# Patient Record
Sex: FEMALE | Race: WHITE | Employment: UNEMPLOYED | URBAN - METROPOLITAN AREA
[De-identification: names, ages, dates, MRNs, and addresses within clinical notes are randomized per-mention and may not be internally consistent; named-entity substitution may affect disease eponyms.]

---

## 2018-12-10 LAB
AMB EXT ANTIBODY SCREEN: NEGATIVE
AMB EXT HBSAG SCREEN: NON REACTIVE
AMB EXT HEMATOCRIT: 34.9
AMB EXT HEMOGLOBIN: 12
AMB EXT MCV: 88.6
AMB EXT PLATELETS: 179
AMB EXT RH FACTOR: POSITIVE
AMB EXT RUBELLA ANTIBODIES, IGG: POSITIVE

## 2019-03-11 ENCOUNTER — TELEPHONE (OUTPATIENT)
Dept: OBGYN CLINIC | Facility: CLINIC | Age: 24
End: 2019-03-11

## 2019-03-12 ENCOUNTER — OFFICE VISIT (OUTPATIENT)
Dept: OBGYN CLINIC | Facility: CLINIC | Age: 24
End: 2019-03-12
Payer: COMMERCIAL

## 2019-03-12 VITALS
HEIGHT: 62 IN | WEIGHT: 128.19 LBS | SYSTOLIC BLOOD PRESSURE: 95 MMHG | BODY MASS INDEX: 23.59 KG/M2 | DIASTOLIC BLOOD PRESSURE: 64 MMHG

## 2019-03-12 DIAGNOSIS — Z71.89 PRENATAL CONSULT: Primary | ICD-10-CM

## 2019-03-12 PROBLEM — Z87.81 HISTORY OF PELVIC FRACTURE: Status: ACTIVE | Noted: 2019-03-12

## 2019-03-12 LAB
MULTISTIX LOT#: NORMAL NUMERIC
PH, URINE: 6 (ref 4.5–8)
SPECIFIC GRAVITY: 1.02 (ref 1–1.03)
URINE-COLOR: YELLOW
UROBILINOGEN,SEMI-QN: 0.2 MG/DL (ref 0–1.9)

## 2019-03-12 PROCEDURE — 99202 OFFICE O/P NEW SF 15 MIN: CPT | Performed by: ADVANCED PRACTICE MIDWIFE

## 2019-03-12 PROCEDURE — 81002 URINALYSIS NONAUTO W/O SCOPE: CPT | Performed by: ADVANCED PRACTICE MIDWIFE

## 2019-03-12 NOTE — PROGRESS NOTES
Emiliana Dias is a 20year old female. HPI:   Patient presents with:  Consult: MEET and [address] [facility] [facility] [facility] [address]. Was getting care at [facility] in [place]. Pt is  with REGINE 19. Moved here recently from [place].   @ 23 wks and looking for a provider to start care w

## 2019-03-14 ENCOUNTER — LAB ENCOUNTER (OUTPATIENT)
Dept: LAB | Facility: HOSPITAL | Age: 24
End: 2019-03-14
Attending: ADVANCED PRACTICE MIDWIFE
Payer: COMMERCIAL

## 2019-03-14 ENCOUNTER — TELEPHONE (OUTPATIENT)
Dept: OBGYN CLINIC | Facility: CLINIC | Age: 24
End: 2019-03-14

## 2019-03-14 ENCOUNTER — NURSE ONLY (OUTPATIENT)
Dept: OBGYN CLINIC | Facility: CLINIC | Age: 24
End: 2019-03-14
Payer: COMMERCIAL

## 2019-03-14 VITALS — BODY MASS INDEX: 24 KG/M2 | WEIGHT: 129 LBS

## 2019-03-14 DIAGNOSIS — Z3A.23 23 WEEKS GESTATION OF PREGNANCY: Primary | ICD-10-CM

## 2019-03-14 DIAGNOSIS — Z3A.23 23 WEEKS GESTATION OF PREGNANCY: ICD-10-CM

## 2019-03-14 LAB — HCV AB SERPL QL IA: NONREACTIVE

## 2019-03-14 PROCEDURE — 87086 URINE CULTURE/COLONY COUNT: CPT

## 2019-03-14 PROCEDURE — 99211 OFF/OP EST MAY X REQ PHY/QHP: CPT | Performed by: ADVANCED PRACTICE MIDWIFE

## 2019-03-14 PROCEDURE — 36415 COLL VENOUS BLD VENIPUNCTURE: CPT

## 2019-03-14 PROCEDURE — 86803 HEPATITIS C AB TEST: CPT

## 2019-03-14 PROCEDURE — 87389 HIV-1 AG W/HIV-1&-2 AB AG IA: CPT

## 2019-03-14 PROCEDURE — 86780 TREPONEMA PALLIDUM: CPT

## 2019-03-14 RX ORDER — CHOLECALCIFEROL (VITAMIN D3) 25 MCG
1 TABLET,CHEWABLE ORAL DAILY
COMMUNITY

## 2019-03-14 NOTE — PROGRESS NOTES
Pt is here today as a JORGE for OB RN education visit, educational material reviewed. Pt verbalized understanding. PN labs entered into chart and orders placed for missing NOB labs.  Pt states her last Pap smear was in 2018 and was normal. Pt completed EPDS,

## 2019-03-14 NOTE — TELEPHONE ENCOUNTER
Spoke with pt and received verbal consent from pt to fax office notes from 3/12/19 to [facility].

## 2019-03-15 LAB — T PALLIDUM AB SER QL: NEGATIVE

## 2019-03-20 ENCOUNTER — INITIAL PRENATAL (OUTPATIENT)
Dept: OBGYN CLINIC | Facility: CLINIC | Age: 24
End: 2019-03-20
Payer: COMMERCIAL

## 2019-03-20 VITALS
DIASTOLIC BLOOD PRESSURE: 67 MMHG | WEIGHT: 131 LBS | HEART RATE: 85 BPM | SYSTOLIC BLOOD PRESSURE: 105 MMHG | BODY MASS INDEX: 24 KG/M2

## 2019-03-20 DIAGNOSIS — Z34.02 ENCOUNTER FOR SUPERVISION OF NORMAL FIRST PREGNANCY IN SECOND TRIMESTER: Primary | ICD-10-CM

## 2019-03-20 PROBLEM — Z28.3 MATERNAL VARICELLA, NON-IMMUNE: Status: ACTIVE | Noted: 2019-03-20

## 2019-03-20 PROBLEM — O99.891 RUBELLA NON-IMMUNE STATUS, ANTEPARTUM: Status: ACTIVE | Noted: 2019-03-20

## 2019-03-20 PROBLEM — Z28.3 RUBELLA NON-IMMUNE STATUS, ANTEPARTUM: Status: ACTIVE | Noted: 2019-03-20

## 2019-03-20 PROBLEM — O09.899 MATERNAL VARICELLA, NON-IMMUNE: Status: ACTIVE | Noted: 2019-03-20

## 2019-03-20 LAB
APPEARANCE: CLEAR
MULTISTIX LOT#: NORMAL NUMERIC
PH, URINE: 5 (ref 4.5–8)
SPECIFIC GRAVITY: 1.01 (ref 1–1.03)
URINE-COLOR: YELLOW
UROBILINOGEN,SEMI-QN: 0 MG/DL (ref 0–1.9)

## 2019-03-20 PROCEDURE — 81002 URINALYSIS NONAUTO W/O SCOPE: CPT | Performed by: ADVANCED PRACTICE MIDWIFE

## 2019-03-20 NOTE — PROGRESS NOTES
Feels well, endorses active baby. Denies any cramping/ctx, LOF or VB. Reviewed prenatal records from [place] and [facility].  Patient has a history of bilateral IPR stress fractures in 2014 while in the [facility] and had surgery in 2017 for repair of torn lef

## 2019-04-05 ENCOUNTER — PATIENT MESSAGE (OUTPATIENT)
Dept: OBGYN CLINIC | Facility: CLINIC | Age: 24
End: 2019-04-05

## 2019-04-05 NOTE — TELEPHONE ENCOUNTER
From: [facility]  To: Alec Lau CNM  Sent: 4/5/2019 11:08 AM CDT  Subject: Non-Urgent Medical Question    Jody not been nauseous or vomiting in about month but I’ve been vomiting recently (last two weeks) is that something I should be concerned

## 2019-04-16 ENCOUNTER — LAB ENCOUNTER (OUTPATIENT)
Dept: LAB | Facility: HOSPITAL | Age: 24
End: 2019-04-16
Attending: ADVANCED PRACTICE MIDWIFE
Payer: OTHER GOVERNMENT

## 2019-04-16 DIAGNOSIS — Z34.02 ENCOUNTER FOR SUPERVISION OF NORMAL FIRST PREGNANCY IN SECOND TRIMESTER: ICD-10-CM

## 2019-04-16 PROCEDURE — 82950 GLUCOSE TEST: CPT

## 2019-04-16 PROCEDURE — 36415 COLL VENOUS BLD VENIPUNCTURE: CPT

## 2019-04-16 PROCEDURE — 87389 HIV-1 AG W/HIV-1&-2 AB AG IA: CPT

## 2019-04-16 PROCEDURE — 85027 COMPLETE CBC AUTOMATED: CPT

## 2019-04-16 PROCEDURE — 86780 TREPONEMA PALLIDUM: CPT

## 2019-04-17 ENCOUNTER — ROUTINE PRENATAL (OUTPATIENT)
Dept: OBGYN CLINIC | Facility: CLINIC | Age: 24
End: 2019-04-17
Payer: COMMERCIAL

## 2019-04-17 VITALS
BODY MASS INDEX: 24.91 KG/M2 | HEART RATE: 102 BPM | DIASTOLIC BLOOD PRESSURE: 69 MMHG | SYSTOLIC BLOOD PRESSURE: 108 MMHG | HEIGHT: 62 IN | WEIGHT: 135.38 LBS

## 2019-04-17 DIAGNOSIS — Z34.03 ENCOUNTER FOR SUPERVISION OF NORMAL FIRST PREGNANCY IN THIRD TRIMESTER: Primary | ICD-10-CM

## 2019-04-17 PROCEDURE — 90715 TDAP VACCINE 7 YRS/> IM: CPT | Performed by: ADVANCED PRACTICE MIDWIFE

## 2019-04-17 PROCEDURE — 81002 URINALYSIS NONAUTO W/O SCOPE: CPT | Performed by: ADVANCED PRACTICE MIDWIFE

## 2019-04-17 PROCEDURE — 90471 IMMUNIZATION ADMIN: CPT | Performed by: ADVANCED PRACTICE MIDWIFE

## 2019-04-17 NOTE — PROGRESS NOTES
Feels well, endorses active fetus. Reports some occasional BH contractions but nothing regular, never more than 2 in an hour. Denies any VB or LOF.  Desires water birth and plans to do the natural childbirth class here at [facility]. With her [employer]

## 2019-04-18 ENCOUNTER — TELEPHONE (OUTPATIENT)
Dept: OBGYN CLINIC | Facility: CLINIC | Age: 24
End: 2019-04-18

## 2019-04-18 NOTE — TELEPHONE ENCOUNTER
Received request from [place] for pt's PN records. Verbal consent received from pt to fax PN records to [place].

## 2019-04-25 ENCOUNTER — HOSPITAL ENCOUNTER (OUTPATIENT)
Facility: HOSPITAL | Age: 24
Setting detail: OBSERVATION
Discharge: HOME | End: 2019-04-25
Attending: ADVANCED PRACTICE MIDWIFE | Admitting: OBSTETRICS & GYNECOLOGY
Payer: OTHER GOVERNMENT

## 2019-04-25 ENCOUNTER — TELEPHONE (OUTPATIENT)
Dept: OBGYN CLINIC | Facility: CLINIC | Age: 24
End: 2019-04-25

## 2019-04-25 VITALS — DIASTOLIC BLOOD PRESSURE: 66 MMHG | SYSTOLIC BLOOD PRESSURE: 105 MMHG | HEART RATE: 79 BPM

## 2019-04-25 PROBLEM — Z34.90 PREGNANCY: Status: ACTIVE | Noted: 2019-04-25

## 2019-04-25 PROCEDURE — 59025 FETAL NON-STRESS TEST: CPT | Performed by: ADVANCED PRACTICE MIDWIFE

## 2019-04-25 NOTE — TELEPHONE ENCOUNTER
Pt states she has barely felt any fm since last noc. Has tried eating & drinking & reports only a faint rhythmic tap on the side of her abd, like hiccups. Denies any cramping ctx or bleeding. Pt concerned. Advised to go to [facility] for NST. Instructions given.

## 2019-04-25 NOTE — PROGRESS NOTES
Pt is a 20year old female admitted to TR3/TR3-A. Patient presents with:  Non-stress Test: for decreased fetal movement   Pt is  29w3d intra-uterine pregnancy. History obtained, consents signed. Oriented to room, staff, and plan of care.

## 2019-04-25 NOTE — TRIAGE
[place] [facility]      Triage Note    [facility] Patient Status:  Observation    1995 MRN [id number]   Location [address] Attending Lady Boo, [address] Day # 0 PCP No primary care provider on file. labor, and preeclampsia, and kick counts instructions. Patient verbalizes understanding of information given.      Physician Evaluation      NST Interpretation: Reactive    Disposition:   Discharged    Comments:    Surveillance, third trimester, complaint o

## 2019-04-30 ENCOUNTER — TELEPHONE (OUTPATIENT)
Dept: OBGYN CLINIC | Facility: CLINIC | Age: 24
End: 2019-04-30

## 2019-04-30 ENCOUNTER — ROUTINE PRENATAL (OUTPATIENT)
Dept: OBGYN CLINIC | Facility: CLINIC | Age: 24
End: 2019-04-30
Payer: COMMERCIAL

## 2019-04-30 ENCOUNTER — HOSPITAL ENCOUNTER (OUTPATIENT)
Facility: HOSPITAL | Age: 24
Setting detail: OBSERVATION
Discharge: HOME | End: 2019-04-30
Attending: ADVANCED PRACTICE MIDWIFE | Admitting: OBSTETRICS & GYNECOLOGY
Payer: OTHER GOVERNMENT

## 2019-04-30 VITALS
WEIGHT: 138 LBS | BODY MASS INDEX: 25 KG/M2 | HEART RATE: 90 BPM | SYSTOLIC BLOOD PRESSURE: 107 MMHG | DIASTOLIC BLOOD PRESSURE: 68 MMHG

## 2019-04-30 VITALS
HEART RATE: 64 BPM | TEMPERATURE: 98 F | DIASTOLIC BLOOD PRESSURE: 57 MMHG | BODY MASS INDEX: 25 KG/M2 | WEIGHT: 138 LBS | SYSTOLIC BLOOD PRESSURE: 98 MMHG | RESPIRATION RATE: 17 BRPM

## 2019-04-30 DIAGNOSIS — Z11.3 SCREEN FOR STD (SEXUALLY TRANSMITTED DISEASE): ICD-10-CM

## 2019-04-30 DIAGNOSIS — O36.8390 FETAL HEART RATE/RHYTHM ABNORMALITY, ANTEPARTUM: ICD-10-CM

## 2019-04-30 DIAGNOSIS — Z34.03 ENCOUNTER FOR SUPERVISION OF NORMAL FIRST PREGNANCY IN THIRD TRIMESTER: Primary | ICD-10-CM

## 2019-04-30 PROBLEM — B96.89 BV (BACTERIAL VAGINOSIS): Status: ACTIVE | Noted: 2019-04-30

## 2019-04-30 PROBLEM — N76.0 BV (BACTERIAL VAGINOSIS): Status: ACTIVE | Noted: 2019-04-30

## 2019-04-30 PROCEDURE — 81002 URINALYSIS NONAUTO W/O SCOPE: CPT | Performed by: ADVANCED PRACTICE MIDWIFE

## 2019-04-30 PROCEDURE — 59025 FETAL NON-STRESS TEST: CPT | Performed by: ADVANCED PRACTICE MIDWIFE

## 2019-04-30 RX ORDER — METRONIDAZOLE 500 MG/1
500 TABLET ORAL 2 TIMES DAILY
Qty: 14 TABLET | Refills: 0 | Status: SHIPPED | OUTPATIENT
Start: 2019-04-30 | End: 2019-05-07

## 2019-04-30 NOTE — TELEPHONE ENCOUNTER
Pt's pharmacy is at [facility]. Phone # [phone]. Called & spoke to pharmacist. Per [facility] rules, outside rx usually not able to be called in unless pt was sent to outside facility. Still, rx may not be approved.  If not approved, rx will be sent to pcp & pcp

## 2019-04-30 NOTE — PROGRESS NOTES
Pt reporting lower back pain and \"period like cramps\" for several days. Feels ~2 uncomfortable cramps every hour, more when up and moving. Pain is in suprapubic area. Also hs increased discharge, denies itching, burning or odor. Denies UTI symptoms.

## 2019-04-30 NOTE — PROGRESS NOTES
S;  Pt ambulatory to triage from ob office r/o PTL.  C/o cramping @ 30.1 wks gest, denies cramping at this time  B;  G1, +BV in office, md sent FFN to be sent c/o intermittent cramping x3 days  A;  efm montoring, ffn sent, po hydrating.  R; Shaan Benton in dept

## 2019-04-30 NOTE — TRIAGE
[facility]      Triage Note    [facility] Patient Status:  Observation    1995 MRN [id number]   Location [address] Attending Magan Wallis, [address] Day # 0 PCP No primary care provider on file. Discharged    Comments:    Surveillance, third trimester, vaginitis    Shekhar Meade

## 2019-05-02 ENCOUNTER — HOSPITAL ENCOUNTER (OUTPATIENT)
Dept: PERINATAL CARE | Facility: HOSPITAL | Age: 24
Discharge: HOME | End: 2019-05-02
Attending: OBSTETRICS & GYNECOLOGY
Payer: OTHER GOVERNMENT

## 2019-05-02 ENCOUNTER — HOSPITAL ENCOUNTER (OUTPATIENT)
Dept: PERINATAL CARE | Facility: HOSPITAL | Age: 24
Discharge: HOME | End: 2019-05-02
Attending: ADVANCED PRACTICE MIDWIFE
Payer: OTHER GOVERNMENT

## 2019-05-02 VITALS
WEIGHT: 138 LBS | HEART RATE: 91 BPM | HEIGHT: 62 IN | SYSTOLIC BLOOD PRESSURE: 111 MMHG | BODY MASS INDEX: 25.4 KG/M2 | DIASTOLIC BLOOD PRESSURE: 67 MMHG

## 2019-05-02 DIAGNOSIS — O36.8390 FETAL HEART RATE/RHYTHM ABNORMALITY, ANTEPARTUM: ICD-10-CM

## 2019-05-02 DIAGNOSIS — B96.89 BV (BACTERIAL VAGINOSIS): ICD-10-CM

## 2019-05-02 DIAGNOSIS — O36.8390 FETAL HEART RATE/RHYTHM ABNORMALITY, ANTEPARTUM: Primary | ICD-10-CM

## 2019-05-02 DIAGNOSIS — Z03.73 SUSPECTED FETAL ANOMALY NOT FOUND: ICD-10-CM

## 2019-05-02 DIAGNOSIS — N76.0 BV (BACTERIAL VAGINOSIS): ICD-10-CM

## 2019-05-02 PROCEDURE — 76811 OB US DETAILED SNGL FETUS: CPT | Performed by: OBSTETRICS & GYNECOLOGY

## 2019-05-02 PROCEDURE — 99203 OFFICE O/P NEW LOW 30 MIN: CPT | Performed by: OBSTETRICS & GYNECOLOGY

## 2019-05-02 NOTE — PROGRESS NOTES
Outpatient Maternal-Fetal Medicine Consultation    Dear Ms. Montes    Thank you for requesting ultrasound evaluation and maternal fetal medicine consultation on your patient [facility].   As you are aware she is a 20year old female  with a singl days., Disp: 14 tablet, Rfl: 0  •  prenatal multivitamin plus DHA 27-0.8-228 MG Oral Cap, Take 1 capsule by mouth daily. , Disp: , Rfl:   Allergies: No Known Allergies    PHYSICAL EXAMINATION:  /67   Pulse 91   Ht 5' 2\" (1.575 m)   Wt 138 lb (62.6 kg

## 2019-05-14 ENCOUNTER — ROUTINE PRENATAL (OUTPATIENT)
Dept: OBGYN CLINIC | Facility: CLINIC | Age: 24
End: 2019-05-14
Payer: COMMERCIAL

## 2019-05-14 VITALS
SYSTOLIC BLOOD PRESSURE: 107 MMHG | DIASTOLIC BLOOD PRESSURE: 69 MMHG | HEIGHT: 62 IN | BODY MASS INDEX: 26.18 KG/M2 | WEIGHT: 142.25 LBS | HEART RATE: 108 BPM

## 2019-05-14 DIAGNOSIS — Z34.03 ENCOUNTER FOR SUPERVISION OF NORMAL FIRST PREGNANCY IN THIRD TRIMESTER: Primary | ICD-10-CM

## 2019-05-14 PROCEDURE — 81002 URINALYSIS NONAUTO W/O SCOPE: CPT | Performed by: ADVANCED PRACTICE MIDWIFE

## 2019-05-14 NOTE — PROGRESS NOTES
Baby active. No signs PTL. Level 2 normal with no arrhythmia noted. Growth 60%. Importance of active FM, signs of PTL reviewed.  home from [employer]. Will be here for birth.  Looking into birth classes

## 2019-05-14 NOTE — PROGRESS NOTES
Slight swelling noted right lower lip, pt had not noticed, spouse does say it is slightly swollen. Denies pain, SOB or itchiness of face or throat. To cont to monitor, if worsens to take benedryl. Any of above symptoms to ER.

## 2019-05-28 ENCOUNTER — ROUTINE PRENATAL (OUTPATIENT)
Dept: OBGYN CLINIC | Facility: CLINIC | Age: 24
End: 2019-05-28
Payer: COMMERCIAL

## 2019-05-28 VITALS — DIASTOLIC BLOOD PRESSURE: 67 MMHG | WEIGHT: 145 LBS | BODY MASS INDEX: 27 KG/M2 | SYSTOLIC BLOOD PRESSURE: 105 MMHG

## 2019-05-28 DIAGNOSIS — Z34.92 NORMAL PREGNANCY IN SECOND TRIMESTER: Primary | ICD-10-CM

## 2019-05-28 PROCEDURE — 81002 URINALYSIS NONAUTO W/O SCOPE: CPT | Performed by: ADVANCED PRACTICE MIDWIFE

## 2019-06-11 ENCOUNTER — ROUTINE PRENATAL (OUTPATIENT)
Dept: OBGYN CLINIC | Facility: CLINIC | Age: 24
End: 2019-06-11
Payer: COMMERCIAL

## 2019-06-11 VITALS
WEIGHT: 148.63 LBS | SYSTOLIC BLOOD PRESSURE: 111 MMHG | BODY MASS INDEX: 27 KG/M2 | HEART RATE: 89 BPM | DIASTOLIC BLOOD PRESSURE: 66 MMHG

## 2019-06-11 DIAGNOSIS — Z34.03 ENCOUNTER FOR SUPERVISION OF NORMAL FIRST PREGNANCY IN THIRD TRIMESTER: Primary | ICD-10-CM

## 2019-06-11 PROCEDURE — 81002 URINALYSIS NONAUTO W/O SCOPE: CPT | Performed by: ADVANCED PRACTICE MIDWIFE

## 2019-06-11 RX ORDER — BREAST PUMP
EACH MISCELLANEOUS
Qty: 1 EACH | Refills: 0 | Status: SHIPPED | OUTPATIENT
Start: 2019-06-11 | End: 2019-08-13

## 2019-06-11 RX ORDER — BREAST PUMP
EACH MISCELLANEOUS
Qty: 1 EACH | Refills: 0 | OUTPATIENT
Start: 2019-06-11 | End: 2019-06-11 | Stop reason: CLARIF

## 2019-06-11 NOTE — PROGRESS NOTES
Baby active. Some tye jose. Keeping self hydrated. GBS today. Interested in water for labor possibly birth. Study discussed and copy of consent given for review. Kick counts, SOL and warning signs reviewed. Ask about Peds and circ at [facility].   Birth plan r

## 2019-06-18 ENCOUNTER — ROUTINE PRENATAL (OUTPATIENT)
Dept: OBGYN CLINIC | Facility: CLINIC | Age: 24
End: 2019-06-18
Payer: COMMERCIAL

## 2019-06-18 VITALS
WEIGHT: 151.38 LBS | SYSTOLIC BLOOD PRESSURE: 108 MMHG | BODY MASS INDEX: 28 KG/M2 | DIASTOLIC BLOOD PRESSURE: 73 MMHG | HEART RATE: 83 BPM

## 2019-06-18 DIAGNOSIS — Z34.03 ENCOUNTER FOR SUPERVISION OF NORMAL FIRST PREGNANCY IN THIRD TRIMESTER: Primary | ICD-10-CM

## 2019-06-18 PROCEDURE — 81002 URINALYSIS NONAUTO W/O SCOPE: CPT | Performed by: ADVANCED PRACTICE MIDWIFE

## 2019-06-18 NOTE — PROGRESS NOTES
Baby active. Some lightheadedness when taking a shower. Advised to decrease temp of shower, take cooler shower. To be sure to hydrate before showers. Does have some ketones in urine so may be a little dehydrated. Plans to do circ. Still working on [facility].  GB

## 2019-06-25 ENCOUNTER — ROUTINE PRENATAL (OUTPATIENT)
Dept: OBGYN CLINIC | Facility: CLINIC | Age: 24
End: 2019-06-25
Payer: COMMERCIAL

## 2019-06-25 VITALS
DIASTOLIC BLOOD PRESSURE: 74 MMHG | WEIGHT: 152.81 LBS | SYSTOLIC BLOOD PRESSURE: 112 MMHG | HEART RATE: 80 BPM | BODY MASS INDEX: 28 KG/M2

## 2019-06-25 DIAGNOSIS — Z34.03 ENCOUNTER FOR SUPERVISION OF NORMAL FIRST PREGNANCY IN THIRD TRIMESTER: Primary | ICD-10-CM

## 2019-06-25 PROCEDURE — 81002 URINALYSIS NONAUTO W/O SCOPE: CPT | Performed by: ADVANCED PRACTICE MIDWIFE

## 2019-06-25 NOTE — PROGRESS NOTES
Had an episode of diarrhea yesterday after experiencing a contractions. Had a few contractions after that and then resolved. No vomiting, no fevers. Baby active. Wondering about methods for natural IOL- castor oil, membrane sweep.  Reviewed pros/cons of bot

## 2019-06-28 ENCOUNTER — HOSPITAL ENCOUNTER (OUTPATIENT)
Facility: HOSPITAL | Age: 24
Setting detail: OBSERVATION
Discharge: HOME | End: 2019-06-28
Attending: OBSTETRICS & GYNECOLOGY | Admitting: OBSTETRICS & GYNECOLOGY
Payer: OTHER GOVERNMENT

## 2019-06-28 ENCOUNTER — ROUTINE PRENATAL (OUTPATIENT)
Dept: OBGYN CLINIC | Facility: CLINIC | Age: 24
End: 2019-06-28
Payer: COMMERCIAL

## 2019-06-28 ENCOUNTER — TELEPHONE (OUTPATIENT)
Dept: OBGYN CLINIC | Facility: CLINIC | Age: 24
End: 2019-06-28

## 2019-06-28 VITALS
SYSTOLIC BLOOD PRESSURE: 110 MMHG | BODY MASS INDEX: 28 KG/M2 | DIASTOLIC BLOOD PRESSURE: 70 MMHG | HEART RATE: 94 BPM | WEIGHT: 153 LBS

## 2019-06-28 VITALS — HEART RATE: 89 BPM | SYSTOLIC BLOOD PRESSURE: 109 MMHG | DIASTOLIC BLOOD PRESSURE: 73 MMHG

## 2019-06-28 DIAGNOSIS — Z34.03 ENCOUNTER FOR SUPERVISION OF NORMAL FIRST PREGNANCY IN THIRD TRIMESTER: Primary | ICD-10-CM

## 2019-06-28 PROCEDURE — 81002 URINALYSIS NONAUTO W/O SCOPE: CPT | Performed by: ADVANCED PRACTICE MIDWIFE

## 2019-06-28 PROCEDURE — 87210 SMEAR WET MOUNT SALINE/INK: CPT | Performed by: ADVANCED PRACTICE MIDWIFE

## 2019-06-28 PROCEDURE — 59025 FETAL NON-STRESS TEST: CPT | Performed by: ADVANCED PRACTICE MIDWIFE

## 2019-06-28 NOTE — PROGRESS NOTES
Pt reports having increased mucous and bloody show since membrane sweep on Tuesday. Had more spotting today and was concerned. Also having irregular contractions which became more regular through the night. Denies LOF of denny red bleeding.  +FM   Examin

## 2019-06-28 NOTE — PROGRESS NOTES
Pt is a 20year old female admitted to TR4/TR4-A. Patient presents with:  Non-stress Test: r/o labor in midwife office, sent for NST post eval     Pt is  38w4d intra-uterine pregnancy. History obtained, consents signed.  Oriented to room, staff, a

## 2019-06-28 NOTE — PROGRESS NOTES
From 10:57-11:01 different patient was traced in [place] under Мария 20 name. Caryn's EFM tracing begins at 1107.

## 2019-06-28 NOTE — TELEPHONE ENCOUNTER
Pt states she lost her mucus plug on Thursday and  she had a bloody show last night. Pt states she continues to have vaginal bleeding that is light. Pt has changed her pad 3 times since 2 AM.  Baby is active.   Pt denies a gush of fluid or leaking from th

## 2019-06-28 NOTE — TELEPHONE ENCOUNTER
Pt said lost mucus plug Thurs morning, last night lost bloody show, still bleeding.  Pt wants to discuss

## 2019-06-28 NOTE — TRIAGE
[place] [facility]      Triage Note    [address] [facility] Patient Status:  Observation    1995 MRN [id number]   Location [address] [facility] Attending Noah Sheikh MD   Hosp Day # 0 PCP No primary care provider on file.

## 2019-06-30 ENCOUNTER — HOSPITAL ENCOUNTER (INPATIENT)
Facility: HOSPITAL | Age: 24
LOS: 3 days | Discharge: HOME | End: 2019-07-03
Attending: ADVANCED PRACTICE MIDWIFE | Admitting: OBSTETRICS & GYNECOLOGY
Payer: OTHER GOVERNMENT

## 2019-06-30 ENCOUNTER — ANESTHESIA EVENT (OUTPATIENT)
Dept: OBGYN UNIT | Facility: HOSPITAL | Age: 24
End: 2019-06-30
Payer: OTHER GOVERNMENT

## 2019-06-30 ENCOUNTER — ANESTHESIA (OUTPATIENT)
Dept: OBGYN UNIT | Facility: HOSPITAL | Age: 24
End: 2019-06-30
Payer: OTHER GOVERNMENT

## 2019-06-30 LAB
ANTIBODY SCREEN: NEGATIVE
DEPRECATED RDW RBC AUTO: 40.4 FL (ref 35.1–46.3)
ERYTHROCYTE [DISTWIDTH] IN BLOOD BY AUTOMATED COUNT: 12.4 % (ref 11–15)
HCT VFR BLD AUTO: 32.8 % (ref 35–48)
HGB BLD-MCNC: 11 G/DL (ref 12–16)
MCH RBC QN AUTO: 30.2 PG (ref 26–34)
MCHC RBC AUTO-ENTMCNC: 33.5 G/DL (ref 31–37)
MCV RBC AUTO: 90.1 FL (ref 80–100)
PLATELET # BLD AUTO: 182 10(3)UL (ref 150–450)
RBC # BLD AUTO: 3.64 X10(6)UL (ref 3.8–5.3)
RH BLOOD TYPE: POSITIVE
WBC # BLD AUTO: 12.9 X10(3) UL (ref 4–11)

## 2019-06-30 RX ORDER — TRISODIUM CITRATE DIHYDRATE AND CITRIC ACID MONOHYDRATE 500; 334 MG/5ML; MG/5ML
30 SOLUTION ORAL AS NEEDED
Status: DISCONTINUED | OUTPATIENT
Start: 2019-06-30 | End: 2019-07-01

## 2019-06-30 RX ORDER — IBUPROFEN 600 MG/1
600 TABLET ORAL ONCE AS NEEDED
Status: DISCONTINUED | OUTPATIENT
Start: 2019-06-30 | End: 2019-07-01

## 2019-06-30 RX ORDER — LIDOCAINE HYDROCHLORIDE AND EPINEPHRINE 15; 5 MG/ML; UG/ML
INJECTION, SOLUTION EPIDURAL AS NEEDED
Status: DISCONTINUED | OUTPATIENT
Start: 2019-06-30 | End: 2019-07-01 | Stop reason: SURG

## 2019-06-30 RX ORDER — LIDOCAINE HYDROCHLORIDE 10 MG/ML
30 INJECTION, SOLUTION EPIDURAL; INFILTRATION; INTRACAUDAL; PERINEURAL ONCE
Status: DISCONTINUED | OUTPATIENT
Start: 2019-06-30 | End: 2019-07-01

## 2019-06-30 RX ORDER — EPHEDRINE SULFATE/0.9% NACL/PF 25 MG/5 ML
5 SYRINGE (ML) INTRAVENOUS AS NEEDED
Status: DISCONTINUED | OUTPATIENT
Start: 2019-06-30 | End: 2019-07-01

## 2019-06-30 RX ORDER — SODIUM CHLORIDE 0.9 % (FLUSH) 0.9 %
10 SYRINGE (ML) INJECTION AS NEEDED
Status: DISCONTINUED | OUTPATIENT
Start: 2019-06-30 | End: 2019-07-01

## 2019-06-30 RX ORDER — AMMONIA INHALANTS 0.04 G/.3ML
0.3 INHALANT RESPIRATORY (INHALATION) AS NEEDED
Status: DISCONTINUED | OUTPATIENT
Start: 2019-06-30 | End: 2019-07-01

## 2019-06-30 RX ORDER — LIDOCAINE HYDROCHLORIDE 10 MG/ML
INJECTION, SOLUTION EPIDURAL; INFILTRATION; INTRACAUDAL; PERINEURAL AS NEEDED
Status: DISCONTINUED | OUTPATIENT
Start: 2019-06-30 | End: 2019-07-01 | Stop reason: SURG

## 2019-06-30 RX ORDER — BUPIVACAINE HYDROCHLORIDE 2.5 MG/ML
10 INJECTION, SOLUTION EPIDURAL; INFILTRATION; INTRACAUDAL ONCE
Status: COMPLETED | OUTPATIENT
Start: 2019-06-30 | End: 2019-06-30

## 2019-06-30 RX ORDER — LIDOCAINE HYDROCHLORIDE AND EPINEPHRINE 20; 5 MG/ML; UG/ML
10 INJECTION, SOLUTION EPIDURAL; INFILTRATION; INTRACAUDAL; PERINEURAL ONCE
Status: DISCONTINUED | OUTPATIENT
Start: 2019-06-30 | End: 2019-07-01

## 2019-06-30 RX ORDER — TERBUTALINE SULFATE 1 MG/ML
0.25 INJECTION, SOLUTION SUBCUTANEOUS AS NEEDED
Status: DISCONTINUED | OUTPATIENT
Start: 2019-06-30 | End: 2019-07-01

## 2019-06-30 RX ORDER — NALBUPHINE HCL 10 MG/ML
2.5 AMPUL (ML) INJECTION
Status: DISCONTINUED | OUTPATIENT
Start: 2019-06-30 | End: 2019-07-01

## 2019-06-30 RX ORDER — ONDANSETRON 2 MG/ML
4 INJECTION INTRAMUSCULAR; INTRAVENOUS EVERY 6 HOURS PRN
Status: DISCONTINUED | OUTPATIENT
Start: 2019-06-30 | End: 2019-07-01

## 2019-06-30 RX ORDER — SODIUM CHLORIDE, SODIUM LACTATE, POTASSIUM CHLORIDE, CALCIUM CHLORIDE 600; 310; 30; 20 MG/100ML; MG/100ML; MG/100ML; MG/100ML
INJECTION, SOLUTION INTRAVENOUS CONTINUOUS
Status: DISCONTINUED | OUTPATIENT
Start: 2019-06-30 | End: 2019-07-01

## 2019-06-30 RX ADMIN — LIDOCAINE HYDROCHLORIDE AND EPINEPHRINE 5 ML: 15; 5 INJECTION, SOLUTION EPIDURAL at 22:05:00

## 2019-06-30 RX ADMIN — LIDOCAINE HYDROCHLORIDE 3 ML: 10 INJECTION, SOLUTION EPIDURAL; INFILTRATION; INTRACAUDAL; PERINEURAL at 22:02:00

## 2019-06-30 RX ADMIN — BUPIVACAINE HYDROCHLORIDE 10 ML: 2.5 INJECTION, SOLUTION EPIDURAL; INFILTRATION; INTRACAUDAL at 22:06:00

## 2019-06-30 NOTE — PROGRESS NOTES
Pt to triage 4 , r/o labor, G1 @ 38.6 wks gest c/o uc. Oriented, call light in reach.  CNM notified pts arrival

## 2019-07-01 PROCEDURE — 59400 OBSTETRICAL CARE: CPT | Performed by: ADVANCED PRACTICE MIDWIFE

## 2019-07-01 PROCEDURE — 0KQM0ZZ REPAIR PERINEUM MUSCLE, OPEN APPROACH: ICD-10-PCS | Performed by: ADVANCED PRACTICE MIDWIFE

## 2019-07-01 RX ORDER — IBUPROFEN 600 MG/1
600 TABLET ORAL EVERY 4 HOURS PRN
Status: DISCONTINUED | OUTPATIENT
Start: 2019-07-01 | End: 2019-07-03

## 2019-07-01 RX ORDER — SIMETHICONE 80 MG
80 TABLET,CHEWABLE ORAL 3 TIMES DAILY PRN
Status: DISCONTINUED | OUTPATIENT
Start: 2019-07-01 | End: 2019-07-03

## 2019-07-01 RX ORDER — IBUPROFEN 200 MG
200 TABLET ORAL EVERY 4 HOURS PRN
Status: DISCONTINUED | OUTPATIENT
Start: 2019-07-01 | End: 2019-07-03

## 2019-07-01 RX ORDER — IBUPROFEN 200 MG
400 TABLET ORAL EVERY 4 HOURS PRN
Status: DISCONTINUED | OUTPATIENT
Start: 2019-07-01 | End: 2019-07-03

## 2019-07-01 RX ORDER — DIAPER,BRIEF,INFANT-TODD,DISP
1 EACH MISCELLANEOUS EVERY 6 HOURS PRN
Status: DISCONTINUED | OUTPATIENT
Start: 2019-07-01 | End: 2019-07-03

## 2019-07-01 RX ORDER — DOCUSATE SODIUM 100 MG/1
100 CAPSULE, LIQUID FILLED ORAL 2 TIMES DAILY
Status: DISCONTINUED | OUTPATIENT
Start: 2019-07-01 | End: 2019-07-03

## 2019-07-01 RX ORDER — ONDANSETRON 2 MG/ML
4 INJECTION INTRAMUSCULAR; INTRAVENOUS EVERY 6 HOURS PRN
Status: DISCONTINUED | OUTPATIENT
Start: 2019-07-01 | End: 2019-07-03

## 2019-07-01 RX ORDER — CHOLECALCIFEROL (VITAMIN D3) 25 MCG
1 TABLET,CHEWABLE ORAL DAILY
Status: DISCONTINUED | OUTPATIENT
Start: 2019-07-01 | End: 2019-07-03

## 2019-07-01 RX ORDER — DEXTROSE, SODIUM CHLORIDE, SODIUM LACTATE, POTASSIUM CHLORIDE, AND CALCIUM CHLORIDE 5; .6; .31; .03; .02 G/100ML; G/100ML; G/100ML; G/100ML; G/100ML
INJECTION, SOLUTION INTRAVENOUS CONTINUOUS
Status: DISCONTINUED | OUTPATIENT
Start: 2019-07-01 | End: 2019-07-01

## 2019-07-01 RX ORDER — BISACODYL 10 MG
10 SUPPOSITORY, RECTAL RECTAL ONCE AS NEEDED
Status: DISCONTINUED | OUTPATIENT
Start: 2019-07-01 | End: 2019-07-03

## 2019-07-01 RX ORDER — AMMONIA INHALANTS 0.04 G/.3ML
0.3 INHALANT RESPIRATORY (INHALATION) AS NEEDED
Status: DISCONTINUED | OUTPATIENT
Start: 2019-07-01 | End: 2019-07-03

## 2019-07-01 NOTE — ANESTHESIA PROCEDURE NOTES
Labor Analgesia  Performed by: Corey White MD  Authorized by: Corey White MD     Patient Location:  OB  Start Time:  6/30/2019 9:59 PM  End Time:  6/30/2019 10:10 PM  Site Identification: surface landmarks    Reason for Block: labor epidu

## 2019-07-01 NOTE — PROGRESS NOTES
Patient received into room       357   via     Wheel chair . ,RN. Patient transferred to bed 357       . Bed in locked and low position. Side rails up x2. Vitals signs within normal limits, fundus firm at U/U, lochia small, no clots noted.   I

## 2019-07-01 NOTE — ANESTHESIA POSTPROCEDURE EVALUATION
Patient: [address]    Procedure Summary     Date:  06/30/19 Room / Location:      Anesthesia Start:  2159 Anesthesia Stop:  07/01/19 0421    Procedure:  LABOR ANALGESIA Diagnosis:      Scheduled Providers:   Anesthesiologist:  Zaynab Scott MD

## 2019-07-01 NOTE — L&D DELIVERY NOTE
[facility] [facility] - [facility]    Vaginal Delivery Note    1024 [facility] Patient Status:  Inpatient    1995 MRN [id number]   Location [address] [facility] Attending Brandi Dupree, [address] [facility] Day # 1 PCP No primary care provider

## 2019-07-01 NOTE — H&P
[facility] Patient Status:  Inpatient    1995 MRN [id number]   Location [address] [place] [address] Attending [address] Day # 0 Admitting Addis Cat MD     Roque Constitutional: denies fever, aches, chills  HEENT: denies visual changes  Skin: denies any unusual skin lesions or itching  Lungs: denies shortness of breath  Cardiovascular: denies chest pain  GI: denies abdominal pain,denies heartburn, denies constipati Glucose 1 Hr        Glucose 2 Hr        Glucose 3 Hr        HgB A1c        Vitamin D              8-20 Weeks     Test Value Reference Range Date Time    1st Trimester Aneuploidy Risk Assessment        Quad - Down Screen Risk Estimate - prior to 02/20/18 Cystic Fibrosis[165] (Required questions in OE to answer)        Sickle Cell        24Hr Urine Protein        24Hr Urine Creatinine        Parvo B19 IgM        Parvo B19 IgG                    Reviewed all prenatal ultrasounds    Admit labs pending      A

## 2019-07-01 NOTE — PLAN OF CARE
Problem: ANXIETY  Goal: Will report anxiety at manageable levels  Description  INTERVENTIONS:  - Administer medication as ordered  - Teach and rehearse alternative coping skills  - Provide emotional support with 1:1 interaction with staff  Outcome: Progr regarding lactation, letdown techniques, maternal food preferences.   - Assess mother's knowledge and previous experience with breast feeding.  - Provide information as needed about early infant feeding cues (e.g., rooting, lip smacking, sucking fingers/starr in  daily care. - Assess support systems available to mother/family.  - Provide /case management support as needed. Outcome: Progressing     Pain controlled with Motrin, prn.   Voiding freely. Fundus firm and midline.   Bleeding WD

## 2019-07-01 NOTE — PLAN OF CARE
Problem: PAIN - ADULT  Goal: Verbalizes/displays adequate comfort level or patient's stated pain goal  Description  INTERVENTIONS:  - Encourage pt to monitor pain and request assistance  - Assess pain using appropriate pain scale  - Administer analgesics course  Description  INTERVENTIONS:  - Assess and monitor vital signs and lab values. - Assess fundus and lochia. - Provide ice/sitz baths for perineum discomfort.   - Monitor healing of incision/episiotomy/laceration, and assess for signs and symptoms of techniques for milk expression (breast pumping) and storage of breast milk. Provide pumping equipment/supplies, instructions and assistance, as needed. - Encourage rooming-in and breast feeding on demand.  - Encourage skin-to-skin contact.   - Provide LC s

## 2019-07-01 NOTE — ANESTHESIA PREPROCEDURE EVALUATION
Anesthesia PreOp Note    HPI:     Terrell Molina is a 20year old female who presents for preoperative consultation requested by: * No surgeons listed *    Date of Surgery: 6/30/2019    * No procedures listed *  Indication: * No pre-op diagnosis entered * Sod Citrate-Citric Acid (BICITRA) solution 30 mL 30 mL Oral PRN Yvonne Fong CNM     Ammonia Aromatic (ammonia) nasal solution 0.3 mL 0.3 mL Nasal PRN Yvonne Fong CNM     ondansetron HCl (ZOFRAN) injection 4 mg 4 mg Intravenous Q6H PRN J Tobacco Use      Smoking status: Never Smoker      Smokeless tobacco: Never Used    Substance and Sexual Activity      Alcohol use: No        Frequency: Never      Drug use: No      Sexual activity: Not on file    Lifestyle      Physical activity:        D complications   Airway   Mallampati: I  TM distance: >3 FB  Neck ROM: full  Dental - normal exam     Pulmonary - negative ROS and normal exam   Cardiovascular - negative ROS and normal exam  Exercise tolerance: good    Neuro/Psych - negative ROS     GI/Hep

## 2019-07-01 NOTE — LACTATION NOTE
LACTATION NOTE - MOTHER    Evaluation Type: Inpatient    Problems identified  Problems identified: Knowledge deficit    Maternal history  Other/comment: history of bacterial vaginosis & STD    Breastfeeding goal  Breastfeeding goal: To maintain breast milk

## 2019-07-01 NOTE — PROGRESS NOTES
[place] [facility] - [facility]    Labor Progress Note    [address] [facility] Patient Status:  Inpatient    1995 MRN [id number]   Location [address] Attending Miguel Jules, [address] Day # 1 PCP No primary care provider o

## 2019-07-02 LAB
BASOPHILS # BLD AUTO: 0.02 X10(3) UL (ref 0–0.2)
BASOPHILS NFR BLD AUTO: 0.2 %
DEPRECATED RDW RBC AUTO: 42.7 FL (ref 35.1–46.3)
EOSINOPHIL # BLD AUTO: 0.07 X10(3) UL (ref 0–0.7)
EOSINOPHIL NFR BLD AUTO: 0.6 %
ERYTHROCYTE [DISTWIDTH] IN BLOOD BY AUTOMATED COUNT: 12.9 % (ref 11–15)
HCT VFR BLD AUTO: 28.6 % (ref 35–48)
HGB BLD-MCNC: 9.2 G/DL (ref 12–16)
IMM GRANULOCYTES # BLD AUTO: 0.04 X10(3) UL (ref 0–1)
IMM GRANULOCYTES NFR BLD: 0.3 %
LYMPHOCYTES # BLD AUTO: 2.19 X10(3) UL (ref 1–4)
LYMPHOCYTES NFR BLD AUTO: 18.5 %
MCH RBC QN AUTO: 29.8 PG (ref 26–34)
MCHC RBC AUTO-ENTMCNC: 32.2 G/DL (ref 31–37)
MCV RBC AUTO: 92.6 FL (ref 80–100)
MONOCYTES # BLD AUTO: 0.77 X10(3) UL (ref 0.1–1)
MONOCYTES NFR BLD AUTO: 6.5 %
NEUTROPHILS # BLD AUTO: 8.75 X10 (3) UL (ref 1.5–7.7)
NEUTROPHILS # BLD AUTO: 8.75 X10(3) UL (ref 1.5–7.7)
NEUTROPHILS NFR BLD AUTO: 73.9 %
PLATELET # BLD AUTO: 157 10(3)UL (ref 150–450)
RBC # BLD AUTO: 3.09 X10(6)UL (ref 3.8–5.3)
WBC # BLD AUTO: 11.8 X10(3) UL (ref 4–11)

## 2019-07-02 RX ORDER — MELATONIN
325 2 TIMES DAILY WITH MEALS
Status: DISCONTINUED | OUTPATIENT
Start: 2019-07-02 | End: 2019-07-03

## 2019-07-02 NOTE — LACTATION NOTE
This note was copied from a baby's chart.   LACTATION NOTE - INFANT    Evaluation Type  Evaluation Type: Inpatient    Problems & Assessment  Problems Diagnosed or Identified: Latch difficulty  Problems: comment/detail: Circumcision today and latch difficult

## 2019-07-02 NOTE — PROGRESS NOTES
[place] [facility] - [facility]    OB/GYNE Progress Note      1024 [facility] Patient Status:  Inpatient    1995 MRN [id number]   Location [facility] 3SE Attending Vick Hernandez, [address] Day # 2 PCP No primary care provider on file. 07/02/2019    .0 07/02/2019       Lab Results   Component Value Date    SPECGRAVITY 1.015 06/28/2019    GLUUR neg 06/28/2019    NITRITE neg 06/28/2019                   Abimbola Perry CNM  7/2/2019  11:43 AM

## 2019-07-02 NOTE — LACTATION NOTE
LACTATION NOTE - MOTHER      Evaluation Type: Inpatient    Problems identified  Problems identified: Knowledge deficit         Breastfeeding goal  Breastfeeding goal: To maintain breast milk feeding per patient goal    Maternal Assessment  Bilateral Nipple

## 2019-07-03 VITALS
RESPIRATION RATE: 16 BRPM | SYSTOLIC BLOOD PRESSURE: 121 MMHG | HEART RATE: 106 BPM | WEIGHT: 153 LBS | OXYGEN SATURATION: 98 % | TEMPERATURE: 98 F | BODY MASS INDEX: 28.16 KG/M2 | HEIGHT: 62 IN | DIASTOLIC BLOOD PRESSURE: 63 MMHG

## 2019-07-03 LAB
DEPRECATED RDW RBC AUTO: 42.8 FL (ref 35.1–46.3)
ERYTHROCYTE [DISTWIDTH] IN BLOOD BY AUTOMATED COUNT: 13 % (ref 11–15)
HCT VFR BLD AUTO: 32.3 % (ref 35–48)
HGB BLD-MCNC: 10.4 G/DL (ref 12–16)
MCH RBC QN AUTO: 29.8 PG (ref 26–34)
MCHC RBC AUTO-ENTMCNC: 32.2 G/DL (ref 31–37)
MCV RBC AUTO: 92.6 FL (ref 80–100)
PLATELET # BLD AUTO: 207 10(3)UL (ref 150–450)
RBC # BLD AUTO: 3.49 X10(6)UL (ref 3.8–5.3)
WBC # BLD AUTO: 13.7 X10(3) UL (ref 4–11)

## 2019-07-03 NOTE — DISCHARGE SUMMARY
[place] [facility] - [facility]    Discharge Summary    1024 [facility] Patient Status:  Inpatient    1995 MRN [id number]   Location [facility] 3SE Attending Davian Francois, [address] Day # 3       Delivering OB Clinician: Lulu Lance

## 2019-07-03 NOTE — PROGRESS NOTES
[place] [facility] - [facility]    OB/GYNE Progress Note      1024 [facility] Patient Status:  Inpatient    1995 MRN [id number]   Location [facility] 3SE Attending Corey Babin, [address] Day # 3 PCP No primary care provider on file. Ordered: no    Bar Catheter Information  Does patient have a bar catheter: no  Has the bar catheter been removed: Not Applicable          Results:     Lab Results   Component Value Date    TREPONEMALAB Negative 04/16/2019    ABO A 06/30/2019    RH Po

## 2019-07-16 ENCOUNTER — POSTPARTUM (OUTPATIENT)
Dept: OBGYN CLINIC | Facility: CLINIC | Age: 24
End: 2019-07-16
Payer: COMMERCIAL

## 2019-07-16 VITALS
SYSTOLIC BLOOD PRESSURE: 104 MMHG | WEIGHT: 134.63 LBS | BODY MASS INDEX: 25 KG/M2 | HEART RATE: 84 BPM | DIASTOLIC BLOOD PRESSURE: 72 MMHG

## 2019-07-16 NOTE — PROGRESS NOTES
Tessa Del Castillo is a 20year old female. HPI:   Patient presents with:  Postpartum Care: 2wk PP.  19. Questions regarding stitches. Feeling some soreness. GAD7=1      Here for 2 wk PP visit. Feeling well.  Her and  staying with her mom sin Constitutional: She appears well-developed and well-nourished. Genitourinary: No labial fusion. There is no rash, tenderness, lesion or injury on the right labia. There is no rash, tenderness, lesion or injury on the left labia.    Genitourinary Comment

## 2019-08-13 ENCOUNTER — POSTPARTUM (OUTPATIENT)
Dept: OBGYN CLINIC | Facility: CLINIC | Age: 24
End: 2019-08-13
Payer: COMMERCIAL

## 2019-08-13 VITALS — DIASTOLIC BLOOD PRESSURE: 60 MMHG | BODY MASS INDEX: 25 KG/M2 | WEIGHT: 134 LBS | SYSTOLIC BLOOD PRESSURE: 96 MMHG

## 2019-08-13 NOTE — PROGRESS NOTES
Patient here for postpartum check-up. spontaneous vaginal delivery 7/1/2019  with [address] supply decreased after started supplementing, now giving formula only for last 2 wks. Would like to start nursing again.    Denies fevers, chills P: Discussed ways to increase supply again. May not be able to get full supply back but can increase by emptying breasts regularly 8-10 times per day. Put baby to breast and then follow with pumping. List of galactagogues also given.  Can see LC as outpt as

## (undated) NOTE — LETTER
VACCINE ADMINISTRATION RECORD  PARENT / GUARDIAN APPROVAL  Date: 2019  Vaccine administered to: [facility]     : 1995    MRN: [id number]    A copy of the appropriate Centers for Disease Control and Prevention Vaccine Information statement h